# Patient Record
Sex: MALE | Race: OTHER | HISPANIC OR LATINO
[De-identification: names, ages, dates, MRNs, and addresses within clinical notes are randomized per-mention and may not be internally consistent; named-entity substitution may affect disease eponyms.]

---

## 2018-01-31 PROBLEM — Z00.00 ENCOUNTER FOR PREVENTIVE HEALTH EXAMINATION: Status: ACTIVE | Noted: 2018-01-31

## 2018-02-01 ENCOUNTER — APPOINTMENT (OUTPATIENT)
Dept: CARDIOLOGY | Facility: CLINIC | Age: 38
End: 2018-02-01

## 2018-02-01 VITALS
DIASTOLIC BLOOD PRESSURE: 80 MMHG | HEART RATE: 62 BPM | SYSTOLIC BLOOD PRESSURE: 126 MMHG | BODY MASS INDEX: 29.47 KG/M2 | HEIGHT: 75 IN | WEIGHT: 237 LBS

## 2018-02-01 DIAGNOSIS — Z84.89 FAMILY HISTORY OF OTHER SPECIFIED CONDITIONS: ICD-10-CM

## 2018-02-01 DIAGNOSIS — R07.89 OTHER CHEST PAIN: ICD-10-CM

## 2021-01-29 ENCOUNTER — APPOINTMENT (OUTPATIENT)
Dept: UROLOGY | Facility: CLINIC | Age: 41
End: 2021-01-29
Payer: COMMERCIAL

## 2021-01-29 VITALS
SYSTOLIC BLOOD PRESSURE: 130 MMHG | WEIGHT: 236 LBS | HEIGHT: 76 IN | BODY MASS INDEX: 28.74 KG/M2 | DIASTOLIC BLOOD PRESSURE: 86 MMHG | TEMPERATURE: 98.9 F | HEART RATE: 89 BPM

## 2021-01-29 DIAGNOSIS — Z82.49 FAMILY HISTORY OF ISCHEMIC HEART DISEASE AND OTHER DISEASES OF THE CIRCULATORY SYSTEM: ICD-10-CM

## 2021-01-29 DIAGNOSIS — Z78.9 OTHER SPECIFIED HEALTH STATUS: ICD-10-CM

## 2021-01-29 PROCEDURE — 99072 ADDL SUPL MATRL&STAF TM PHE: CPT

## 2021-01-29 PROCEDURE — 99203 OFFICE O/P NEW LOW 30 MIN: CPT

## 2021-01-29 RX ORDER — CELECOXIB 200 MG/1
200 CAPSULE ORAL DAILY
Qty: 4 | Refills: 3 | Status: ACTIVE | COMMUNITY
Start: 2021-01-29 | End: 1900-01-01

## 2021-01-29 RX ORDER — CEFUROXIME AXETIL 500 MG/1
500 TABLET ORAL
Qty: 6 | Refills: 1 | Status: ACTIVE | COMMUNITY
Start: 2021-01-29 | End: 1900-01-01

## 2021-01-29 NOTE — HISTORY OF PRESENT ILLNESS
[Urinary Frequency] : urinary frequency [Nocturia] : nocturia [FreeTextEntry1] : 40-year-old male here for initial consultation requesting bilateral vasectomy. Denies any previous urologic history.\par Patient is  with 2 children [Weak Stream] : no weak stream [Intermittency] : no intermittency [Dysuria] : no dysuria [Fever] : no fever

## 2021-01-29 NOTE — ASSESSMENT
[FreeTextEntry1] : #1. Voluntary sterilization\par \par Plan\par -Bilateral vasectomy/ [place]/ sedation and local anesthesia/ ambulatory--patient agreed\par -Cefuroxime\par -Celebrex\par Informed consent obtained, risks and benefits discussed including but not limited to infection, bleeding, sepsis, post-op retention, Unforeseen complications such as MI, CVA, DVT, PE.risks, expectations, not reversible, abscess , gangrene, swelling.\par

## 2021-01-29 NOTE — PHYSICAL EXAM
[General Appearance - Well Developed] : well developed [General Appearance - Well Nourished] : well nourished [Normal Appearance] : normal appearance [Well Groomed] : well groomed [General Appearance - In No Acute Distress] : no acute distress [Edema] : no peripheral edema [Exaggerated Use Of Accessory Muscles For Inspiration] : no accessory muscle use [Respiration, Rhythm And Depth] : normal respiratory rhythm and effort [Abdomen Soft] : soft [Abdomen Tenderness] : non-tender [Costovertebral Angle Tenderness] : no ~M costovertebral angle tenderness [Urethral Meatus] : meatus normal [Penis Abnormality] : normal circumcised penis [Urinary Bladder Findings] : the bladder was normal on palpation [Scrotum] : the scrotum was normal [Testes Tenderness] : no tenderness of the testes [Testes Mass (___cm)] : there were no testicular masses [Normal Station and Gait] : the gait and station were normal for the patient's age [] : no rash [No Focal Deficits] : no focal deficits [Oriented To Time, Place, And Person] : oriented to person, place, and time [Affect] : the affect was normal [Mood] : the mood was normal [Not Anxious] : not anxious [No Palpable Adenopathy] : no palpable adenopathy [FreeTextEntry1] : vas posterior bilateral

## 2021-03-24 ENCOUNTER — OUTPATIENT (OUTPATIENT)
Dept: OUTPATIENT SERVICES | Facility: HOSPITAL | Age: 41
LOS: 1 days | Discharge: HOME | End: 2021-03-24
Payer: COMMERCIAL

## 2021-03-24 VITALS
OXYGEN SATURATION: 98 % | SYSTOLIC BLOOD PRESSURE: 134 MMHG | DIASTOLIC BLOOD PRESSURE: 81 MMHG | TEMPERATURE: 97 F | HEIGHT: 75 IN | WEIGHT: 242.51 LBS | HEART RATE: 63 BPM | RESPIRATION RATE: 18 BRPM

## 2021-03-24 DIAGNOSIS — Z30.09 ENCOUNTER FOR OTHER GENERAL COUNSELING AND ADVICE ON CONTRACEPTION: ICD-10-CM

## 2021-03-24 DIAGNOSIS — Z01.818 ENCOUNTER FOR OTHER PREPROCEDURAL EXAMINATION: ICD-10-CM

## 2021-03-24 DIAGNOSIS — Z98.890 OTHER SPECIFIED POSTPROCEDURAL STATES: Chronic | ICD-10-CM

## 2021-03-24 LAB
ALBUMIN SERPL ELPH-MCNC: 4.9 G/DL — SIGNIFICANT CHANGE UP (ref 3.5–5.2)
ALP SERPL-CCNC: 48 U/L — SIGNIFICANT CHANGE UP (ref 30–115)
ALT FLD-CCNC: 38 U/L — SIGNIFICANT CHANGE UP (ref 0–41)
ANION GAP SERPL CALC-SCNC: 10 MMOL/L — SIGNIFICANT CHANGE UP (ref 7–14)
APPEARANCE UR: CLEAR — SIGNIFICANT CHANGE UP
APTT BLD: 39.8 SEC — HIGH (ref 27–39.2)
AST SERPL-CCNC: 29 U/L — SIGNIFICANT CHANGE UP (ref 0–41)
BASOPHILS # BLD AUTO: 0.04 K/UL — SIGNIFICANT CHANGE UP (ref 0–0.2)
BASOPHILS NFR BLD AUTO: 0.8 % — SIGNIFICANT CHANGE UP (ref 0–1)
BILIRUB SERPL-MCNC: 0.3 MG/DL — SIGNIFICANT CHANGE UP (ref 0.2–1.2)
BILIRUB UR-MCNC: NEGATIVE — SIGNIFICANT CHANGE UP
BUN SERPL-MCNC: 14 MG/DL — SIGNIFICANT CHANGE UP (ref 10–20)
CALCIUM SERPL-MCNC: 9.7 MG/DL — SIGNIFICANT CHANGE UP (ref 8.5–10.1)
CHLORIDE SERPL-SCNC: 101 MMOL/L — SIGNIFICANT CHANGE UP (ref 98–110)
CO2 SERPL-SCNC: 26 MMOL/L — SIGNIFICANT CHANGE UP (ref 17–32)
COLOR SPEC: YELLOW — SIGNIFICANT CHANGE UP
CREAT SERPL-MCNC: 1.2 MG/DL — SIGNIFICANT CHANGE UP (ref 0.7–1.5)
DIFF PNL FLD: NEGATIVE — SIGNIFICANT CHANGE UP
EOSINOPHIL # BLD AUTO: 0.16 K/UL — SIGNIFICANT CHANGE UP (ref 0–0.7)
EOSINOPHIL NFR BLD AUTO: 3.1 % — SIGNIFICANT CHANGE UP (ref 0–8)
GLUCOSE SERPL-MCNC: 76 MG/DL — SIGNIFICANT CHANGE UP (ref 70–99)
GLUCOSE UR QL: NEGATIVE — SIGNIFICANT CHANGE UP
HCT VFR BLD CALC: 42.8 % — SIGNIFICANT CHANGE UP (ref 42–52)
HGB BLD-MCNC: 14.5 G/DL — SIGNIFICANT CHANGE UP (ref 14–18)
IMM GRANULOCYTES NFR BLD AUTO: 0.2 % — SIGNIFICANT CHANGE UP (ref 0.1–0.3)
INR BLD: 1.01 RATIO — SIGNIFICANT CHANGE UP (ref 0.65–1.3)
KETONES UR-MCNC: NEGATIVE — SIGNIFICANT CHANGE UP
LEUKOCYTE ESTERASE UR-ACNC: NEGATIVE — SIGNIFICANT CHANGE UP
LYMPHOCYTES # BLD AUTO: 2.12 K/UL — SIGNIFICANT CHANGE UP (ref 1.2–3.4)
LYMPHOCYTES # BLD AUTO: 40.5 % — SIGNIFICANT CHANGE UP (ref 20.5–51.1)
MCHC RBC-ENTMCNC: 30.5 PG — SIGNIFICANT CHANGE UP (ref 27–31)
MCHC RBC-ENTMCNC: 33.9 G/DL — SIGNIFICANT CHANGE UP (ref 32–37)
MCV RBC AUTO: 90.1 FL — SIGNIFICANT CHANGE UP (ref 80–94)
MONOCYTES # BLD AUTO: 0.37 K/UL — SIGNIFICANT CHANGE UP (ref 0.1–0.6)
MONOCYTES NFR BLD AUTO: 7.1 % — SIGNIFICANT CHANGE UP (ref 1.7–9.3)
NEUTROPHILS # BLD AUTO: 2.53 K/UL — SIGNIFICANT CHANGE UP (ref 1.4–6.5)
NEUTROPHILS NFR BLD AUTO: 48.3 % — SIGNIFICANT CHANGE UP (ref 42.2–75.2)
NITRITE UR-MCNC: NEGATIVE — SIGNIFICANT CHANGE UP
NRBC # BLD: 0 /100 WBCS — SIGNIFICANT CHANGE UP (ref 0–0)
PH UR: 7 — SIGNIFICANT CHANGE UP (ref 5–8)
PLATELET # BLD AUTO: 224 K/UL — SIGNIFICANT CHANGE UP (ref 130–400)
POTASSIUM SERPL-MCNC: 4.8 MMOL/L — SIGNIFICANT CHANGE UP (ref 3.5–5)
POTASSIUM SERPL-SCNC: 4.8 MMOL/L — SIGNIFICANT CHANGE UP (ref 3.5–5)
PROT SERPL-MCNC: 7.8 G/DL — SIGNIFICANT CHANGE UP (ref 6–8)
PROT UR-MCNC: SIGNIFICANT CHANGE UP
PROTHROM AB SERPL-ACNC: 11.6 SEC — SIGNIFICANT CHANGE UP (ref 9.95–12.87)
RBC # BLD: 4.75 M/UL — SIGNIFICANT CHANGE UP (ref 4.7–6.1)
RBC # FLD: 12.2 % — SIGNIFICANT CHANGE UP (ref 11.5–14.5)
SODIUM SERPL-SCNC: 137 MMOL/L — SIGNIFICANT CHANGE UP (ref 135–146)
SP GR SPEC: 1.03 — SIGNIFICANT CHANGE UP (ref 1.01–1.03)
UROBILINOGEN FLD QL: SIGNIFICANT CHANGE UP
WBC # BLD: 5.23 K/UL — SIGNIFICANT CHANGE UP (ref 4.8–10.8)
WBC # FLD AUTO: 5.23 K/UL — SIGNIFICANT CHANGE UP (ref 4.8–10.8)

## 2021-03-24 PROCEDURE — 93010 ELECTROCARDIOGRAM REPORT: CPT

## 2021-03-24 NOTE — H&P PST ADULT - HISTORY OF PRESENT ILLNESS
41 y/o male presents to PAST for bilateral vasectomy with Dr. Tyson in [facility] under [place] on 4/9/21    Pt states that he wishes to have bilateral vasectomy for voluntary sterilization. Pt is  with 2 children and wishes to have above procedure for sterilization.     PATIENT CURRENTLY DENIES CHEST PAIN  SHORTNESS OF BREATH  PALPITATIONS,  DYSURIA, OR UPPER RESPIRATORY INFECTION IN PAST 2 WEEKS  EXERCISE  TOLERANCE  4-5 FLIGHT OF STAIRS  WITHOUT SHORTNESS OF BREATH  denies travel outside the [place] in the past 30 days  pt denies any covid s/s, or tested positive in the past  pt advised self quarantine till day of procedure    Anesthesia Alert  NO--Difficult Airway  NO--History of neck surgery or radiation  NO--Limited ROM of neck  NO--History of Malignant hyperthermia  NO--No personal or family history of Pseudocholinesterase deficiency.  NO--Prior Anesthesia Complication  NO--Latex Allergy  NO--Loose teeth  NO--History of Rheumatoid Arthritis  NO--AYESHA  NO--Other_____    Encounter for other preprocedural examination    Encounter for other general counseling or advice on contraception    ^[id number]/ [id number]                                                                 04/09/21 [facility]    Encounter for other preprocedural examination    Encounter for other general counseling or advice on contraception    No pertinent past medical history    No pertinent past medical history    No pertinent past medical history    No significant past surgical history    No significant past surgical history

## 2021-03-24 NOTE — H&P PST ADULT - NSANTHOSAYNRD_GEN_A_CORE
No. AYESHA screening performed.  STOP BANG Legend: 0-2 = LOW Risk; 3-4 = INTERMEDIATE Risk; 5-8 = HIGH Risk

## 2021-03-25 LAB
CULTURE RESULTS: SIGNIFICANT CHANGE UP
SPECIMEN SOURCE: SIGNIFICANT CHANGE UP

## 2021-04-06 ENCOUNTER — OUTPATIENT (OUTPATIENT)
Dept: OUTPATIENT SERVICES | Facility: HOSPITAL | Age: 41
LOS: 1 days | Discharge: HOME | End: 2021-04-06

## 2021-04-06 ENCOUNTER — LABORATORY RESULT (OUTPATIENT)
Age: 41
End: 2021-04-06

## 2021-04-06 DIAGNOSIS — Z98.890 OTHER SPECIFIED POSTPROCEDURAL STATES: Chronic | ICD-10-CM

## 2021-04-06 DIAGNOSIS — Z11.59 ENCOUNTER FOR SCREENING FOR OTHER VIRAL DISEASES: ICD-10-CM

## 2021-04-09 ENCOUNTER — RESULT REVIEW (OUTPATIENT)
Age: 41
End: 2021-04-09

## 2021-04-09 ENCOUNTER — OUTPATIENT (OUTPATIENT)
Dept: OUTPATIENT SERVICES | Facility: HOSPITAL | Age: 41
LOS: 1 days | Discharge: HOME | End: 2021-04-09
Payer: COMMERCIAL

## 2021-04-09 ENCOUNTER — APPOINTMENT (OUTPATIENT)
Dept: UROLOGY | Facility: HOSPITAL | Age: 41
End: 2021-04-09

## 2021-04-09 VITALS
HEART RATE: 69 BPM | OXYGEN SATURATION: 97 % | WEIGHT: 235.01 LBS | HEIGHT: 75 IN | SYSTOLIC BLOOD PRESSURE: 126 MMHG | TEMPERATURE: 98 F | RESPIRATION RATE: 19 BRPM | DIASTOLIC BLOOD PRESSURE: 83 MMHG

## 2021-04-09 VITALS — HEART RATE: 52 BPM | DIASTOLIC BLOOD PRESSURE: 87 MMHG | SYSTOLIC BLOOD PRESSURE: 121 MMHG

## 2021-04-09 DIAGNOSIS — Z98.890 OTHER SPECIFIED POSTPROCEDURAL STATES: Chronic | ICD-10-CM

## 2021-04-09 PROCEDURE — 88302 TISSUE EXAM BY PATHOLOGIST: CPT | Mod: 26

## 2021-04-09 PROCEDURE — 55250 REMOVAL OF SPERM DUCT(S): CPT

## 2021-04-09 RX ORDER — SODIUM CHLORIDE 9 MG/ML
1000 INJECTION, SOLUTION INTRAVENOUS
Refills: 0 | Status: DISCONTINUED | OUTPATIENT
Start: 2021-04-09 | End: 2021-04-09

## 2021-04-09 RX ORDER — HYDROMORPHONE HYDROCHLORIDE 2 MG/ML
0.5 INJECTION INTRAMUSCULAR; INTRAVENOUS; SUBCUTANEOUS
Refills: 0 | Status: DISCONTINUED | OUTPATIENT
Start: 2021-04-09 | End: 2021-04-09

## 2021-04-09 RX ORDER — ONDANSETRON 8 MG/1
4 TABLET, FILM COATED ORAL ONCE
Refills: 0 | Status: DISCONTINUED | OUTPATIENT
Start: 2021-04-09 | End: 2021-04-09

## 2021-04-09 RX ORDER — KETOROLAC TROMETHAMINE 30 MG/ML
30 SYRINGE (ML) INJECTION ONCE
Refills: 0 | Status: DISCONTINUED | OUTPATIENT
Start: 2021-04-09 | End: 2021-04-09

## 2021-04-09 RX ADMIN — Medication 30 MILLIGRAM(S): at 14:14

## 2021-04-09 NOTE — ASU PREOP CHECKLIST - HEIGHT IN CM
Problem: Patient Care Overview  Goal: Discharge Needs Assessment  Outcome: Outcome(s) achieved Date Met: 06/02/19         190.5

## 2021-04-09 NOTE — ASU DISCHARGE PLAN (ADULT/PEDIATRIC) - ASU DC SPECIAL INSTRUCTIONSFT
expect some swelling   expect some pain   follow instruction sheet   call office now for f/up appt in  2 weeks

## 2021-04-09 NOTE — CHART NOTE - NSCHARTNOTEFT_GEN_A_CORE
PACU ANESTHESIA ADMISSION NOTE      Procedure: Bilateral vasectomy      Post op diagnosis:  Male sterility      x___  Patent Airway    x____  Full return of protective reflexes    x____  Full recovery from anesthesia / back to baseline     Vitals:   T:  97.6         R:   14     BP:   117/56               Sat:       99%            P: 56      Mental Status:  ____ Awake   _____ Alert   _____ Drowsy   __x___ Sedated    Nausea/Vomiting:  __x__ NO  ______Yes,   See Post - Op Orders          Pain Scale (0-10):  ___0__    Treatment: ____ None    __x__ See Post - Op/PCA Orders    Post - Operative Fluids:   ____ Oral  x____ See Post - Op Orders    Plan: Discharge:   x____Home       _____Floor     _____Critical Care    _____  Other:_________________    Comments:

## 2021-04-12 LAB — SURGICAL PATHOLOGY STUDY: SIGNIFICANT CHANGE UP

## 2021-04-15 ENCOUNTER — RESULT REVIEW (OUTPATIENT)
Age: 41
End: 2021-04-15

## 2021-04-15 ENCOUNTER — OUTPATIENT (OUTPATIENT)
Dept: OUTPATIENT SERVICES | Facility: HOSPITAL | Age: 41
LOS: 1 days | Discharge: HOME | End: 2021-04-15
Payer: COMMERCIAL

## 2021-04-15 ENCOUNTER — APPOINTMENT (OUTPATIENT)
Dept: UROLOGY | Facility: CLINIC | Age: 41
End: 2021-04-15
Payer: COMMERCIAL

## 2021-04-15 VITALS — WEIGHT: 235 LBS | HEIGHT: 76 IN | BODY MASS INDEX: 28.62 KG/M2 | TEMPERATURE: 97.6 F

## 2021-04-15 DIAGNOSIS — N49.2 INFLAMMATORY DISORDERS OF SCROTUM: ICD-10-CM

## 2021-04-15 DIAGNOSIS — Z98.52 VASECTOMY STATUS: ICD-10-CM

## 2021-04-15 DIAGNOSIS — N50.89 OTHER SPECIFIED DISORDERS OF THE MALE GENITAL ORGANS: ICD-10-CM

## 2021-04-15 DIAGNOSIS — Z98.890 OTHER SPECIFIED POSTPROCEDURAL STATES: Chronic | ICD-10-CM

## 2021-04-15 DIAGNOSIS — Z30.09 ENCOUNTER FOR OTHER GENERAL COUNSELING AND ADVICE ON CONTRACEPTION: ICD-10-CM

## 2021-04-15 PROCEDURE — 99024 POSTOP FOLLOW-UP VISIT: CPT

## 2021-04-15 PROCEDURE — 76870 US EXAM SCROTUM: CPT | Mod: 26

## 2021-04-15 RX ORDER — CELECOXIB 200 MG/1
200 CAPSULE ORAL DAILY
Qty: 14 | Refills: 1 | Status: ACTIVE | COMMUNITY
Start: 2021-04-15 | End: 1900-01-01

## 2021-04-19 DIAGNOSIS — Z30.2 ENCOUNTER FOR STERILIZATION: ICD-10-CM

## 2021-04-21 ENCOUNTER — NON-APPOINTMENT (OUTPATIENT)
Age: 41
End: 2021-04-21

## 2021-04-29 ENCOUNTER — APPOINTMENT (OUTPATIENT)
Dept: UROLOGY | Facility: CLINIC | Age: 41
End: 2021-04-29
Payer: COMMERCIAL

## 2021-04-29 VITALS
WEIGHT: 235 LBS | TEMPERATURE: 96.3 F | SYSTOLIC BLOOD PRESSURE: 119 MMHG | HEART RATE: 71 BPM | DIASTOLIC BLOOD PRESSURE: 81 MMHG | BODY MASS INDEX: 29.22 KG/M2 | HEIGHT: 75 IN

## 2021-04-29 DIAGNOSIS — N50.1 VASCULAR DISORDERS OF MALE GENITAL ORGANS: ICD-10-CM

## 2021-04-29 PROCEDURE — 99024 POSTOP FOLLOW-UP VISIT: CPT

## 2021-06-01 ENCOUNTER — APPOINTMENT (OUTPATIENT)
Dept: UROLOGY | Facility: CLINIC | Age: 41
End: 2021-06-01

## 2021-08-10 ENCOUNTER — APPOINTMENT (OUTPATIENT)
Dept: SURGERY | Facility: CLINIC | Age: 41
End: 2021-08-10
Payer: COMMERCIAL

## 2021-08-10 VITALS
HEART RATE: 64 BPM | TEMPERATURE: 97.2 F | HEIGHT: 75 IN | SYSTOLIC BLOOD PRESSURE: 125 MMHG | WEIGHT: 240 LBS | BODY MASS INDEX: 29.84 KG/M2 | DIASTOLIC BLOOD PRESSURE: 80 MMHG

## 2021-08-10 DIAGNOSIS — K64.5 PERIANAL VENOUS THROMBOSIS: ICD-10-CM

## 2021-08-10 PROCEDURE — 99202 OFFICE O/P NEW SF 15 MIN: CPT | Mod: 25

## 2021-08-10 PROCEDURE — 46600 DIAGNOSTIC ANOSCOPY SPX: CPT

## 2021-08-24 ENCOUNTER — APPOINTMENT (OUTPATIENT)
Dept: UROLOGY | Facility: CLINIC | Age: 41
End: 2021-08-24

## 2021-09-01 PROBLEM — K64.5 INTERNAL THROMBOSED HEMORRHOIDS: Status: ACTIVE | Noted: 2021-09-01

## 2021-09-01 NOTE — PROCEDURE
[FreeTextEntry1] : Anoscopy performed using a disposable anoscope.  The patient was place in the left lateral decubitus position. After RYAN was performed the anoscope was inserted and the distal rectum was evaluated along with the anal canal and anal margin.\par \par Findings:\par Examination of the perineum reveals mild perianal excoriation, scattered skin tags no erythema induration drainage or bleeding, no thrombosed external hemorrhoid.\par \par RYAN, good tone no palpable mass.\par \par Anoscopy, nonprolapsing internal hemorrhoids, evidence of resolving thrombosed internal hemorrhoid.

## 2021-09-01 NOTE — ASSESSMENT
[FreeTextEntry1] : Mr. Ovalles likely had a thrombosed internal hemorrhoid.  This is currently resolving.  He currently has no pain or bleeding.  He needs no further therapy.  He will return to see me on an as-needed basis.

## 2021-09-01 NOTE — HISTORY OF PRESENT ILLNESS
[FreeTextEntry1] : There is a new patient visit for Mr. Ovalles who has complaint of anal pain.  He states that several weeks ago he had onset of anal pain but denies any bleeding.  Currently he feels improved.  He has had a colonoscopy in the past.  He has no known family history of colon or rectal cancer.  He denies constipation but does have occasional diarrhea.

## 2021-09-01 NOTE — PHYSICAL EXAM
[Excoriation] : excoriations [Multiple Sinus Tracts] : no perianal sinus tracts [Fistula] : no fistulas [Wart] : no warts [Ulcer ___ cm] : no ulcers [Pilonidal Cyst] : no pilonidal cysts [Pilonidal Sinus] : no pilonidal sinus [Pilonidal Sinus Draining] : no pilonidal sinus drainage [Nonprolapsing] : a nonprolapsing (grade I) [Tender, Swollen] : nontender, non-swollen [Thrombosed] : that was not thrombosed [Skin Tags] : residual hemorrhoidal skin tags were noted [Normal] : was normal [None] : there was no rectal mass  [Alert] : alert [Oriented to Person] : oriented to person [Oriented to Place] : oriented to place [Oriented to Time] : oriented to time [Calm] : calm [de-identified] : Anoscopy performed using a disposable anoscope.  The patient was place in the left lateral decubitus position. After RYAN was performed the anoscope was inserted and the distal rectum was evaluated along with the anal canal and anal margin.\par \par Findings: [de-identified] : Full range of motion [de-identified] : No focal deficits.

## 2022-07-27 ENCOUNTER — APPOINTMENT (OUTPATIENT)
Dept: UROLOGY | Facility: CLINIC | Age: 42
End: 2022-07-27

## 2022-07-29 ENCOUNTER — NON-APPOINTMENT (OUTPATIENT)
Age: 42
End: 2022-07-29

## 2022-09-13 ENCOUNTER — APPOINTMENT (OUTPATIENT)
Dept: OTOLARYNGOLOGY | Facility: CLINIC | Age: 42
End: 2022-09-13

## 2022-09-13 VITALS — BODY MASS INDEX: 30.46 KG/M2 | HEIGHT: 75 IN | WEIGHT: 245 LBS

## 2022-09-13 PROCEDURE — 99204 OFFICE O/P NEW MOD 45 MIN: CPT | Mod: 25

## 2022-09-13 PROCEDURE — 31231 NASAL ENDOSCOPY DX: CPT

## 2022-09-13 RX ORDER — LEVOCETIRIZINE DIHYDROCHLORIDE 5 MG/1
5 TABLET ORAL DAILY
Qty: 1 | Refills: 3 | Status: ACTIVE | COMMUNITY
Start: 2022-09-13 | End: 1900-01-01

## 2022-09-13 RX ORDER — FLUTICASONE PROPIONATE 50 UG/1
50 SPRAY, METERED NASAL DAILY
Qty: 1 | Refills: 7 | Status: ACTIVE | COMMUNITY
Start: 2022-09-13 | End: 1900-01-01

## 2022-09-13 NOTE — PROCEDURE
[Flexible Endoscope] : examined with the flexible endoscope [Congested] : congested [Alice] : alice [Red] : red [Deviated to the Rt] : deviated to the right [Normal] : the paranasal sinuses had no abnormalities

## 2022-09-13 NOTE — REASON FOR VISIT
[Initial Evaluation] : an initial evaluation for [FreeTextEntry2] : right nasal obstruction , metallic taste and smell

## 2022-09-13 NOTE — PHYSICAL EXAM
[Nasal Endoscopy Performed] : nasal endoscopy was performed, see procedure section for findings [Midline] : trachea located in midline position [] : septum deviated to the right [Normal] : no abnormal secretions [de-identified] : nasal valve collapse, improvement with [place] maneuver and lateralization [de-identified] : edema

## 2022-10-18 ENCOUNTER — RESULT REVIEW (OUTPATIENT)
Age: 42
End: 2022-10-18

## 2022-10-18 ENCOUNTER — OUTPATIENT (OUTPATIENT)
Dept: OUTPATIENT SERVICES | Facility: HOSPITAL | Age: 42
LOS: 1 days | Discharge: HOME | End: 2022-10-18

## 2022-10-18 DIAGNOSIS — Z98.890 OTHER SPECIFIED POSTPROCEDURAL STATES: Chronic | ICD-10-CM

## 2022-10-18 DIAGNOSIS — J34.89 OTHER SPECIFIED DISORDERS OF NOSE AND NASAL SINUSES: ICD-10-CM

## 2022-10-18 PROCEDURE — 70486 CT MAXILLOFACIAL W/O DYE: CPT | Mod: 26

## 2022-11-07 ENCOUNTER — APPOINTMENT (OUTPATIENT)
Dept: OTOLARYNGOLOGY | Facility: CLINIC | Age: 42
End: 2022-11-07

## 2022-11-07 VITALS — BODY MASS INDEX: 30.46 KG/M2 | WEIGHT: 245 LBS | HEIGHT: 75 IN

## 2022-11-07 DIAGNOSIS — S09.92XA UNSPECIFIED INJURY OF NOSE, INITIAL ENCOUNTER: ICD-10-CM

## 2022-11-07 PROCEDURE — 99214 OFFICE O/P EST MOD 30 MIN: CPT | Mod: 25

## 2022-11-07 PROCEDURE — 31231 NASAL ENDOSCOPY DX: CPT

## 2022-11-07 NOTE — HISTORY OF PRESENT ILLNESS
[FreeTextEntry1] : Patient returns today following up on right nasal obstruction , metallic taste/smell.  Patient states the metallic taste and smell has gotten a little better.  He still c/o right nasal passage blocked .Patient states it has gotten worst.  He still has a hard time breathing .  He had a CT Maxillofacial done 10/18/22.  He is here to review results

## 2022-11-07 NOTE — ASSESSMENT
[FreeTextEntry1] : Metallic taste probably from covid infection.\par \par I personally reviewed, interpreted and discussed patient's CT images showing a deviated septum.\par \par I discussed with the patient pros and cons and alternatives to a nasal septoplasty and turbinates reduction with outfracture, in addition to ablation of any swollen tissue having an impact of patient's breathing. I discussed the risks of bleeding, CSF leak, infection, orbital injury, nasal pain, risk of recurrence of the obstruction...\par \par I explained to the patient that the shape of his nose will not change after surgery unless we do a cosmetic surgery.\par \par

## 2022-12-27 ENCOUNTER — OUTPATIENT (OUTPATIENT)
Dept: OUTPATIENT SERVICES | Facility: HOSPITAL | Age: 42
LOS: 1 days | Discharge: HOME | End: 2022-12-27

## 2022-12-27 VITALS
RESPIRATION RATE: 14 BRPM | SYSTOLIC BLOOD PRESSURE: 132 MMHG | HEIGHT: 75 IN | DIASTOLIC BLOOD PRESSURE: 80 MMHG | HEART RATE: 68 BPM | TEMPERATURE: 98 F | OXYGEN SATURATION: 97 % | WEIGHT: 249.34 LBS

## 2022-12-27 DIAGNOSIS — Z98.890 OTHER SPECIFIED POSTPROCEDURAL STATES: Chronic | ICD-10-CM

## 2022-12-27 DIAGNOSIS — Z01.818 ENCOUNTER FOR OTHER PREPROCEDURAL EXAMINATION: ICD-10-CM

## 2022-12-27 DIAGNOSIS — Z98.52 VASECTOMY STATUS: Chronic | ICD-10-CM

## 2022-12-27 DIAGNOSIS — J34.2 DEVIATED NASAL SEPTUM: ICD-10-CM

## 2022-12-27 LAB
ALBUMIN SERPL ELPH-MCNC: 4.6 G/DL — SIGNIFICANT CHANGE UP (ref 3.5–5.2)
ALP SERPL-CCNC: 60 U/L — SIGNIFICANT CHANGE UP (ref 30–115)
ALT FLD-CCNC: 25 U/L — SIGNIFICANT CHANGE UP (ref 0–41)
ANION GAP SERPL CALC-SCNC: 10 MMOL/L — SIGNIFICANT CHANGE UP (ref 7–14)
APTT BLD: 32 SEC — SIGNIFICANT CHANGE UP (ref 27–39.2)
AST SERPL-CCNC: 21 U/L — SIGNIFICANT CHANGE UP (ref 0–41)
BASOPHILS # BLD AUTO: 0.04 K/UL — SIGNIFICANT CHANGE UP (ref 0–0.2)
BASOPHILS NFR BLD AUTO: 0.7 % — SIGNIFICANT CHANGE UP (ref 0–1)
BILIRUB SERPL-MCNC: <0.2 MG/DL — SIGNIFICANT CHANGE UP (ref 0.2–1.2)
BUN SERPL-MCNC: 11 MG/DL — SIGNIFICANT CHANGE UP (ref 10–20)
CALCIUM SERPL-MCNC: 9.4 MG/DL — SIGNIFICANT CHANGE UP (ref 8.4–10.5)
CHLORIDE SERPL-SCNC: 102 MMOL/L — SIGNIFICANT CHANGE UP (ref 98–110)
CO2 SERPL-SCNC: 26 MMOL/L — SIGNIFICANT CHANGE UP (ref 17–32)
CREAT SERPL-MCNC: 1.1 MG/DL — SIGNIFICANT CHANGE UP (ref 0.7–1.5)
EGFR: 86 ML/MIN/1.73M2 — SIGNIFICANT CHANGE UP
EOSINOPHIL # BLD AUTO: 0.19 K/UL — SIGNIFICANT CHANGE UP (ref 0–0.7)
EOSINOPHIL NFR BLD AUTO: 3.5 % — SIGNIFICANT CHANGE UP (ref 0–8)
GLUCOSE SERPL-MCNC: 82 MG/DL — SIGNIFICANT CHANGE UP (ref 70–99)
HCT VFR BLD CALC: 41.2 % — LOW (ref 42–52)
HGB BLD-MCNC: 13.9 G/DL — LOW (ref 14–18)
IMM GRANULOCYTES NFR BLD AUTO: 0.4 % — HIGH (ref 0.1–0.3)
INR BLD: 1 RATIO — SIGNIFICANT CHANGE UP (ref 0.65–1.3)
LYMPHOCYTES # BLD AUTO: 1.91 K/UL — SIGNIFICANT CHANGE UP (ref 1.2–3.4)
LYMPHOCYTES # BLD AUTO: 34.7 % — SIGNIFICANT CHANGE UP (ref 20.5–51.1)
MCHC RBC-ENTMCNC: 30.8 PG — SIGNIFICANT CHANGE UP (ref 27–31)
MCHC RBC-ENTMCNC: 33.7 G/DL — SIGNIFICANT CHANGE UP (ref 32–37)
MCV RBC AUTO: 91.2 FL — SIGNIFICANT CHANGE UP (ref 80–94)
MONOCYTES # BLD AUTO: 0.44 K/UL — SIGNIFICANT CHANGE UP (ref 0.1–0.6)
MONOCYTES NFR BLD AUTO: 8 % — SIGNIFICANT CHANGE UP (ref 1.7–9.3)
NEUTROPHILS # BLD AUTO: 2.9 K/UL — SIGNIFICANT CHANGE UP (ref 1.4–6.5)
NEUTROPHILS NFR BLD AUTO: 52.7 % — SIGNIFICANT CHANGE UP (ref 42.2–75.2)
NRBC # BLD: 0 /100 WBCS — SIGNIFICANT CHANGE UP (ref 0–0)
PLATELET # BLD AUTO: 236 K/UL — SIGNIFICANT CHANGE UP (ref 130–400)
POTASSIUM SERPL-MCNC: 4.7 MMOL/L — SIGNIFICANT CHANGE UP (ref 3.5–5)
POTASSIUM SERPL-SCNC: 4.7 MMOL/L — SIGNIFICANT CHANGE UP (ref 3.5–5)
PROT SERPL-MCNC: 7.2 G/DL — SIGNIFICANT CHANGE UP (ref 6–8)
PROTHROM AB SERPL-ACNC: 11.4 SEC — SIGNIFICANT CHANGE UP (ref 9.95–12.87)
RBC # BLD: 4.52 M/UL — LOW (ref 4.7–6.1)
RBC # FLD: 12.4 % — SIGNIFICANT CHANGE UP (ref 11.5–14.5)
SODIUM SERPL-SCNC: 138 MMOL/L — SIGNIFICANT CHANGE UP (ref 135–146)
WBC # BLD: 5.5 K/UL — SIGNIFICANT CHANGE UP (ref 4.8–10.8)
WBC # FLD AUTO: 5.5 K/UL — SIGNIFICANT CHANGE UP (ref 4.8–10.8)

## 2022-12-27 PROCEDURE — 93010 ELECTROCARDIOGRAM REPORT: CPT

## 2022-12-27 NOTE — H&P PST ADULT - REASON FOR ADMISSION
Proceduralist: Fidel Rosario  Procedure: SEPTOPLASTY, BILATERAL INFERIOR TURBINATE REDUCTION, TISSUE ABLATION, BILATERAL MAXILLARY AND FRONTAL BALLOON DILATION   Procedure: 60 Minutes  Anesthesia Type: General  PT DX WITH NASAL OBSTRUCTION.  PT STATES-- I HAVE DIFFICULTY BREATHING THROUGH MY NOSE FOR 20 YEARS.  PT DENIES HAVING ANY PAIN.

## 2022-12-27 NOTE — H&P PST ADULT - NSICDXPASTSURGICALHX_GEN_ALL_CORE_FT
PAST SURGICAL HISTORY:  H/O inguinal hernia repair     H/O umbilical hernia repair     H/O vasectomy

## 2022-12-27 NOTE — H&P PST ADULT - BIRTH SEX
Patient : Jaylon Rivera Age: 18 year old Sex: male   MRN: [id number] Encounter Date: 4/26/2019      History     Chief Complaint   Patient presents with   • Facial Injury     HPI   This is an 18-year-old boy who slipped and fell on rock and struck his nose on the back. He had a nosebleed as well as scraped his nose. He did not pass out. He denies any neck or back pain. He also injured his left thumb tip during the fall. He is right-handed. He has not taken anything for pain. Has not applied ice. His bleeding has stopped. He is up-to-date with tetanus.    No Known Allergies    There are no discharge medications for this patient.      There are no discharge medications for this patient.      Past Medical History:   Diagnosis Date   • Rachele-Parkinson-White (WPW) syndrome        No past surgical history on file.    No family history on file.    Social History     Tobacco Use   • Smoking status: Never Smoker   Substance Use Topics   • Alcohol use: No   • Drug use: No       Review of Systems   HENT: Positive for facial swelling and nosebleeds. Negative for dental problem.    Gastrointestinal: Negative for nausea and vomiting.   Musculoskeletal: Positive for arthralgias. Negative for back pain and myalgias.   Neurological: Negative for dizziness and headaches.       Physical Exam     ED Triage Vitals [04/26/19 2036]   ED Triage Vitals Group      Temp 98 °F (36.7 °C)      Heart Rate 84      Resp 15      /76      SpO2 96 %      EtCO2 mmHg       Height 5' 10\" (1.778 m)      Weight 145 lb (65.8 kg)      Weight Scale Used ED Stated       Physical Exam   Constitutional: He is oriented to person, place, and time. He appears well-developed and well-nourished. No distress.   HENT:   Head: Normocephalic.   Has a nasal abrasion to the left side of his nose. He had been bleeding from both nares but I don't see any blood currently. No septal hematoma. No zygomatic arch tenderness. The bridge of his nose is swollen and mildly  tender. His dentition is without injury and there is no lip injury.    Pulmonary/Chest: Effort normal. No respiratory distress.   Musculoskeletal:   Neck and back have no reproducible tenderness. The left thumb has a crack in the fingernail at the distal tip. He has mild tenderness to palpation of the finger at the distal tip but he has full range of motion at the IP and the MCP joint. He has good capillary refill.   Neurological: He is alert and oriented to person, place, and time.   Nursing note and vitals reviewed.      ED Course     Procedures        Radiology Results     Imaging Results          XR Finger Min 2 View Left (Final result)  Result time 04/26/19 21:13:56    Final result                 Impression:    IMPRESSION: No fracture of the nasal bone or nasal spine.        LEFT THUMB    HISTORY: Fall. Thumb pain.    3 views of the left thumb were obtained.    There is no fracture or dislocation.    There is no intrinsic articular or osseous abnormality.    IMPRESSION: No fracture or dislocation.               Narrative:    NASAL BONES    HISTORY: Fall. Facial trauma.    3 views of the nasal bones were obtained.    The nasal bone and nasal spine are intact.    The paranasal sinuses are well aerated and developed.    There are no osseous abnormalities                               XR Nasal Bones (Final result)  Result time 04/26/19 21:13:56    Final result                 Impression:    IMPRESSION: No fracture of the nasal bone or nasal spine.        LEFT THUMB    HISTORY: Fall. Thumb pain.    3 views of the left thumb were obtained.    There is no fracture or dislocation.    There is no intrinsic articular or osseous abnormality.    IMPRESSION: No fracture or dislocation.               Narrative:    NASAL BONES    HISTORY: Fall. Facial trauma.    3 views of the nasal bones were obtained.    The nasal bone and nasal spine are intact.    The paranasal sinuses are well aerated and developed.    There are no  osseous abnormalities                                ED Medication Orders (From admission, onward)    None               MDM     He is up-to-date with tetanus. He was offered ice which he refused. He was offered pain medication which he refused. X-rays were negative. He was encouraged to use ice at home for pain and swelling and to return if he has new or worsening symptoms.    Clinical Impression     ED Diagnosis   1. Abrasion of nose, initial encounter     2. Contusion of left thumb with damage to nail, initial encounter         Disposition        Discharge 4/26/2019  9:17 PM  Jaylon Rivera discharge to home/self care.         Nessa Esteves MD  04/26/19 5517     Male

## 2022-12-27 NOTE — H&P PST ADULT - HISTORY OF PRESENT ILLNESS
PT PRESENTS TO PAST WITH NO SOB, CP, PALPITATIONS, DYSURIA, UTI OR URI AT PRESENT.   PT ABLE TO WALK UP 2-3 FLIGHTS OF STEPS WITH NO SOB.  AS PER THE PT, THIS IS HIS/HER COMPLETE MEDICAL AND SURGICAL HX, INCLUDING MEDICATIONS PRESCRIBED AND OVER THE COUNTER  pt denies any covid s/s, YES   6/23/22   tested positive -PT IS AWARE OF DATE AND TIME OF COVID TESTING PRIOR TO DOP.   pt advised self quarantine till day of procedure  denies travel outside the [place] in the past 30 days  Anesthesia Alert  NO--Difficult Airway  NO--History of neck surgery or radiation  NO--Limited ROM of neck  NO--History of Malignant hyperthermia  NO--Personal or family history of Pseudocholinesterase deficiency  NO--Prior Anesthesia Complication  NO--Latex Allergy  NO--Loose teeth  NO--History of Rheumatoid Arthritis  NO--AYESHA  NO BLEEDING RISK  NO--Other_____

## 2022-12-27 NOTE — H&P PST ADULT - NEUROLOGICAL
Refill passed per [facility], [facility] protocol.   Refill Protocol Appointment Criteria  · Appointment scheduled in the past 12 months or in the next 3 months  Recent Outpatient Visits            4 weeks ago Dysphagia, unspecified type     [facility] Nagi Fong [id number]    In 3 weeks Nagi Fong    In 1 month Nagi Fong normal/cranial nerves II-XII intact/sensation intact

## 2023-01-14 ENCOUNTER — LABORATORY RESULT (OUTPATIENT)
Age: 43
End: 2023-01-14

## 2023-01-16 NOTE — ASU PATIENT PROFILE, ADULT - FALL HARM RISK - UNIVERSAL INTERVENTIONS
Bed in lowest position, wheels locked, appropriate side rails in place/Call bell, personal items and telephone in reach/Instruct patient to call for assistance before getting out of bed or chair/Non-slip footwear when patient is out of bed/[place] to call system/Physically safe environment - no spills, clutter or unnecessary equipment/Purposeful Proactive Rounding/Room/bathroom lighting operational, light cord in reach

## 2023-01-17 ENCOUNTER — TRANSCRIPTION ENCOUNTER (OUTPATIENT)
Age: 43
End: 2023-01-17

## 2023-01-17 ENCOUNTER — RESULT REVIEW (OUTPATIENT)
Age: 43
End: 2023-01-17

## 2023-01-17 ENCOUNTER — OUTPATIENT (OUTPATIENT)
Dept: OUTPATIENT SERVICES | Facility: HOSPITAL | Age: 43
LOS: 1 days | Discharge: HOME | End: 2023-01-17
Payer: COMMERCIAL

## 2023-01-17 ENCOUNTER — APPOINTMENT (OUTPATIENT)
Dept: OTOLARYNGOLOGY | Facility: AMBULATORY SURGERY CENTER | Age: 43
End: 2023-01-17

## 2023-01-17 VITALS
HEART RATE: 64 BPM | WEIGHT: 249.34 LBS | OXYGEN SATURATION: 98 % | TEMPERATURE: 98 F | DIASTOLIC BLOOD PRESSURE: 73 MMHG | RESPIRATION RATE: 20 BRPM | HEIGHT: 75 IN | SYSTOLIC BLOOD PRESSURE: 129 MMHG

## 2023-01-17 VITALS
OXYGEN SATURATION: 97 % | RESPIRATION RATE: 17 BRPM | TEMPERATURE: 98 F | DIASTOLIC BLOOD PRESSURE: 74 MMHG | SYSTOLIC BLOOD PRESSURE: 122 MMHG | HEART RATE: 76 BPM

## 2023-01-17 DIAGNOSIS — Z98.890 OTHER SPECIFIED POSTPROCEDURAL STATES: Chronic | ICD-10-CM

## 2023-01-17 DIAGNOSIS — Z98.52 VASECTOMY STATUS: Chronic | ICD-10-CM

## 2023-01-17 PROCEDURE — 31295 NSL/SINS NDSC SURG MAX SINS: CPT | Mod: 50

## 2023-01-17 PROCEDURE — 30140 RESECT INFERIOR TURBINATE: CPT | Mod: 50

## 2023-01-17 PROCEDURE — 88304 TISSUE EXAM BY PATHOLOGIST: CPT | Mod: 26

## 2023-01-17 PROCEDURE — 31296 NSL/SINS NDSC SURG FRNT SINS: CPT | Mod: 50

## 2023-01-17 PROCEDURE — 88311 DECALCIFY TISSUE: CPT | Mod: 26

## 2023-01-17 PROCEDURE — 30520 REPAIR OF NASAL SEPTUM: CPT

## 2023-01-17 PROCEDURE — 30117 REMOVAL OF INTRANASAL LESION: CPT

## 2023-01-17 RX ORDER — ONDANSETRON 8 MG/1
4 TABLET, FILM COATED ORAL ONCE
Refills: 0 | Status: DISCONTINUED | OUTPATIENT
Start: 2023-01-17 | End: 2023-01-31

## 2023-01-17 RX ORDER — HYDROMORPHONE HYDROCHLORIDE 2 MG/ML
1 INJECTION INTRAMUSCULAR; INTRAVENOUS; SUBCUTANEOUS
Refills: 0 | Status: DISCONTINUED | OUTPATIENT
Start: 2023-01-17 | End: 2023-01-17

## 2023-01-17 RX ORDER — ACETAMINOPHEN WITH CODEINE 300MG-30MG
1 TABLET ORAL
Qty: 12 | Refills: 0
Start: 2023-01-17 | End: 2023-01-18

## 2023-01-17 RX ORDER — HYDROMORPHONE HYDROCHLORIDE 2 MG/ML
0.5 INJECTION INTRAMUSCULAR; INTRAVENOUS; SUBCUTANEOUS
Refills: 0 | Status: DISCONTINUED | OUTPATIENT
Start: 2023-01-17 | End: 2023-01-17

## 2023-01-17 RX ORDER — MEPERIDINE HYDROCHLORIDE 50 MG/ML
12.5 INJECTION INTRAMUSCULAR; INTRAVENOUS; SUBCUTANEOUS ONCE
Refills: 0 | Status: DISCONTINUED | OUTPATIENT
Start: 2023-01-17 | End: 2023-01-17

## 2023-01-17 RX ORDER — SODIUM CHLORIDE 9 MG/ML
1000 INJECTION, SOLUTION INTRAVENOUS
Refills: 0 | Status: DISCONTINUED | OUTPATIENT
Start: 2023-01-17 | End: 2023-01-31

## 2023-01-17 NOTE — CHART NOTE - NSCHARTNOTEFT_GEN_A_CORE
PACU ANESTHESIA ADMISSION NOTE      Procedure: Septoplasty, turbinate reduction    Balloon sinuplasty of maxillary sinus    Balloon sinuplasty of frontal sinus      Post op diagnosis:  Nasal obstruction    Recurrent sinusitis        ____  Intubated  TV:______       Rate: ______      FiO2: ______    _x___  Patent Airway    _x___  Full return of protective reflexes    ____  Full recovery from anesthesia / back to baseline status    Vitals:P 74 R 16 T 97.8 /85 SpO2 97%  T(C): 36.7 (01-17-23 @ 10:29), Max: 36.7 (01-17-23 @ 09:53)  HR: 64 (01-17-23 @ 10:29) (64 - 64)  BP: 129/73 (01-17-23 @ 10:29) (129/73 - 129/73)  RR: 20 (01-17-23 @ 10:29) (20 - 20)  SpO2: 98% (01-17-23 @ 10:29) (98% - 98%)    Mental Status:  ____ Awake   _____ Alert   _____ Drowsy   __x___ Sedated    Nausea/Vomiting:  _x___  NO       ______Yes,   See Post - Op Orders         Pain Scale (0-10):  __0___    Treatment: _x___ None    ____ See Post - Op/PCA Orders    Post - Operative Fluids:   ____ Oral   __x__ See Post - Op Orders    Plan: Discharge:   _x___Home       _____Floor     _____Critical Care    _____  Other:_________________    Comments:  No anesthesia issues or complications noted.  Discharge when criteria met.

## 2023-01-17 NOTE — BRIEF OPERATIVE NOTE - NSICDXBRIEFPOSTOP_GEN_ALL_CORE_FT
POST-OP DIAGNOSIS:  Recurrent sinusitis 17-Jan-2023 12:33:41  Fidel Rosario  Nasal obstruction 17-Jan-2023 12:33:35  Fidel Rosario

## 2023-01-17 NOTE — BRIEF OPERATIVE NOTE - NSICDXBRIEFPROCEDURE_GEN_ALL_CORE_FT
PROCEDURES:  Septoplasty, turbinate reduction 17-Jan-2023 12:32:08  Fidel Rosario  Balloon sinuplasty of maxillary sinus 17-Jan-2023 12:32:45  Fidel Rosario  Balloon sinuplasty of frontal sinus 17-Jan-2023 12:32:56  Fidel Rosario

## 2023-01-17 NOTE — BRIEF OPERATIVE NOTE - NSICDXBRIEFPREOP_GEN_ALL_CORE_FT
PRE-OP DIAGNOSIS:  Nasal obstruction 17-Jan-2023 12:33:10  Fidel Rosario  Recurrent sinusitis 17-Jan-2023 12:33:31  Fidel Rosario

## 2023-01-18 PROBLEM — J34.89 OTHER SPECIFIED DISORDERS OF NOSE AND NASAL SINUSES: Chronic | Status: ACTIVE | Noted: 2022-12-27

## 2023-01-19 LAB — SURGICAL PATHOLOGY STUDY: SIGNIFICANT CHANGE UP

## 2023-01-20 ENCOUNTER — APPOINTMENT (OUTPATIENT)
Dept: OTOLARYNGOLOGY | Facility: CLINIC | Age: 43
End: 2023-01-20
Payer: COMMERCIAL

## 2023-01-20 DIAGNOSIS — J34.3 HYPERTROPHY OF NASAL TURBINATES: ICD-10-CM

## 2023-01-20 DIAGNOSIS — J34.2 DEVIATED NASAL SEPTUM: ICD-10-CM

## 2023-01-20 DIAGNOSIS — E66.9 OBESITY, UNSPECIFIED: ICD-10-CM

## 2023-01-20 DIAGNOSIS — J32.9 CHRONIC SINUSITIS, UNSPECIFIED: ICD-10-CM

## 2023-01-20 PROCEDURE — 99024 POSTOP FOLLOW-UP VISIT: CPT

## 2023-01-20 NOTE — REASON FOR VISIT
[Post-Operative Visit] : a post-operative visit [FreeTextEntry2] : s/p septoplasty , b/l inferior turbinate reduction , tissue ablation, b/l maxillary and frontal balloon dilation

## 2023-01-20 NOTE — HISTORY OF PRESENT ILLNESS
[FreeTextEntry1] : Patient here s/p septoplasty , b/l inferior turbinate reduction , tissue ablation, b/l maxillary and frontal balloon dilation 01/17/23.

## 2023-01-27 ENCOUNTER — APPOINTMENT (OUTPATIENT)
Dept: OTOLARYNGOLOGY | Facility: CLINIC | Age: 43
End: 2023-01-27
Payer: COMMERCIAL

## 2023-01-27 PROCEDURE — 31237 NSL/SINS NDSC SURG BX POLYPC: CPT | Mod: 50,79

## 2023-01-27 PROCEDURE — 99024 POSTOP FOLLOW-UP VISIT: CPT

## 2023-01-27 NOTE — HISTORY OF PRESENT ILLNESS
[FreeTextEntry1] : Patient here s/p septoplasty , b/l inferior turbinate reduction , tissue ablation, b/l maxillary and frontal balloon dilation 01/17/23.Starting to feel a bit congested  and clogged . Having some soreness when touched .

## 2023-02-03 ENCOUNTER — APPOINTMENT (OUTPATIENT)
Dept: OTOLARYNGOLOGY | Facility: CLINIC | Age: 43
End: 2023-02-03
Payer: COMMERCIAL

## 2023-02-03 DIAGNOSIS — J34.89 OTHER SPECIFIED DISORDERS OF NOSE AND NASAL SINUSES: ICD-10-CM

## 2023-02-03 PROCEDURE — 99024 POSTOP FOLLOW-UP VISIT: CPT

## 2023-02-03 NOTE — HISTORY OF PRESENT ILLNESS
[FreeTextEntry1] : Patient here today s/p septoplasty ,b/l inferior turbinate reduction tissue ablation, b/l maxillary and frontal balloon dilation\par done 12/27/2022.  Patient states he is feeling well and has no complaints.

## 2023-02-03 NOTE — REASON FOR VISIT
[Post-Operative Visit] : a post-operative visit [FreeTextEntry2] : s/p septoplasty ,b/l inferior turbinate reduction tissue ablation, b/l maxillary and frontal balloon dilation

## 2023-03-06 ENCOUNTER — APPOINTMENT (OUTPATIENT)
Dept: OTOLARYNGOLOGY | Facility: CLINIC | Age: 43
End: 2023-03-06
Payer: COMMERCIAL

## 2023-03-06 DIAGNOSIS — J34.3 HYPERTROPHY OF NASAL TURBINATES: ICD-10-CM

## 2023-03-06 DIAGNOSIS — J34.2 DEVIATED NASAL SEPTUM: ICD-10-CM

## 2023-03-06 PROCEDURE — 99024 POSTOP FOLLOW-UP VISIT: CPT

## 2023-03-06 NOTE — HISTORY OF PRESENT ILLNESS
[FreeTextEntry1] : Patient here today s/p septoplasty ,b/l inferior turbinate reduction tissue ablation, b/l maxillary and frontal balloon dilation\par done 12/27/2022. Has noticed an improvement in breathing .

## 2025-02-27 NOTE — PRE-ANESTHESIA EVALUATION ADULT - NS MD HP INPLANTS MED DEV
None PHYSICAL EXAM    Vital Signs Last 24 Hrs  T(C): 37.1 (27 Feb 2025 19:57), Max: 37.1 (27 Feb 2025 19:57)  T(F): 98.7 (27 Feb 2025 19:57), Max: 98.7 (27 Feb 2025 19:57)  HR: 61 (27 Feb 2025 19:57) (61 - 78)  BP: 106/68 (27 Feb 2025 19:57) (106/68 - 150/87)  BP(mean): --  RR: 15 (27 Feb 2025 19:57) (15 - 18)  SpO2: 99% (27 Feb 2025 19:57) (98% - 100%)    Parameters below as of 27 Feb 2025 19:57  Patient On (Oxygen Delivery Method): room air      GENERAL: NAD, lying comfortably in bed   HEAD:  Atraumatic, Normocephalic  EYES: EOMI b/l, PERRLA b/l, conjunctiva and sclera clear  NECK: Supple, No JVD, No LAD   CHEST/LUNG: Clear to auscultation bilaterally; No wheeze or rhonchi, no tenderness to palpation  HEART: Regular rate and rhythm; S1 and S2 present, No murmurs, rubs, or gallops  ABDOMEN: Soft, Nontender, Nondistended; Bowel sounds present  EXTREMITIES:  2+ Peripheral Pulses, No clubbing, cyanosis, or edema  NEURO: AAOx3, non-focal   SKIN: No rashes or lesions